# Patient Record
Sex: MALE | Race: WHITE | NOT HISPANIC OR LATINO | Employment: UNEMPLOYED | URBAN - METROPOLITAN AREA
[De-identification: names, ages, dates, MRNs, and addresses within clinical notes are randomized per-mention and may not be internally consistent; named-entity substitution may affect disease eponyms.]

---

## 2017-04-07 RX ORDER — DIVALPROEX SODIUM 500 MG/1
TABLET, DELAYED RELEASE ORAL
Qty: 90 TABLET | Refills: 0 | Status: SHIPPED | OUTPATIENT
Start: 2017-04-07 | End: 2017-05-01 | Stop reason: SDUPTHER

## 2017-04-07 RX ORDER — PHENYTOIN SODIUM 100 MG/1
CAPSULE, EXTENDED RELEASE ORAL
Qty: 150 CAPSULE | Refills: 0 | Status: SHIPPED | OUTPATIENT
Start: 2017-04-07 | End: 2017-05-01 | Stop reason: SDUPTHER

## 2017-04-11 RX ORDER — DIVALPROEX SODIUM 500 MG/1
TABLET, DELAYED RELEASE ORAL
Qty: 90 TABLET | Refills: 11 | OUTPATIENT
Start: 2017-04-11

## 2017-04-11 RX ORDER — PHENYTOIN SODIUM 100 MG/1
CAPSULE, EXTENDED RELEASE ORAL
Qty: 150 CAPSULE | Refills: 11 | OUTPATIENT
Start: 2017-04-11

## 2017-05-01 ENCOUNTER — OFFICE VISIT (OUTPATIENT)
Dept: NEUROLOGY | Facility: CLINIC | Age: 32
End: 2017-05-01

## 2017-05-01 VITALS
HEIGHT: 60 IN | WEIGHT: 315 LBS | SYSTOLIC BLOOD PRESSURE: 125 MMHG | BODY MASS INDEX: 61.84 KG/M2 | DIASTOLIC BLOOD PRESSURE: 80 MMHG

## 2017-05-01 DIAGNOSIS — R56.9 GENERALIZED CONVULSIVE SEIZURES (HCC): Primary | ICD-10-CM

## 2017-05-01 PROCEDURE — 99214 OFFICE O/P EST MOD 30 MIN: CPT | Performed by: PSYCHIATRY & NEUROLOGY

## 2017-05-01 RX ORDER — PHENYTOIN SODIUM 100 MG/1
CAPSULE, EXTENDED RELEASE ORAL
Qty: 150 CAPSULE | Refills: 11 | Status: SHIPPED | OUTPATIENT
Start: 2017-05-01 | End: 2018-04-04 | Stop reason: SDUPTHER

## 2017-05-01 RX ORDER — HALOPERIDOL 0.5 MG/1
TABLET ORAL
Refills: 3 | COMMUNITY
Start: 2017-04-19 | End: 2020-10-08 | Stop reason: ALTCHOICE

## 2017-05-01 RX ORDER — OLANZAPINE 5 MG/1
TABLET ORAL
Refills: 3 | COMMUNITY
Start: 2017-04-19 | End: 2017-05-01

## 2017-05-01 RX ORDER — DIVALPROEX SODIUM 500 MG/1
TABLET, DELAYED RELEASE ORAL
Qty: 90 TABLET | Refills: 11 | Status: SHIPPED | OUTPATIENT
Start: 2017-05-01 | End: 2018-04-04 | Stop reason: SDUPTHER

## 2017-05-01 RX ORDER — ZONISAMIDE 100 MG/1
CAPSULE ORAL
Qty: 90 CAPSULE | Refills: 11 | Status: SHIPPED | OUTPATIENT
Start: 2017-05-01 | End: 2018-05-06 | Stop reason: SDUPTHER

## 2017-08-28 ENCOUNTER — TELEPHONE (OUTPATIENT)
Dept: NEUROLOGY | Facility: CLINIC | Age: 32
End: 2017-08-28

## 2017-08-28 DIAGNOSIS — R56.9 GENERALIZED CONVULSIVE SEIZURES (HCC): Primary | ICD-10-CM

## 2017-08-28 NOTE — TELEPHONE ENCOUNTER
Would like to have levels of Dilantin and Depakote before changing medicines.  Orders for levels are in the chart

## 2017-08-28 NOTE — TELEPHONE ENCOUNTER
----- Message from Natividad Maddox sent at 8/28/2017 12:20 PM EDT -----  Contact: [phone]  Patient's father calling to let us know that his son is having a lot of seizures. He requests that the medication be upped. If he needs to make an appt please let him know. Patient last saw Dr. Escoto in April. Call him when you can, thank you.

## 2018-04-04 RX ORDER — PHENYTOIN SODIUM 100 MG/1
CAPSULE, EXTENDED RELEASE ORAL
Qty: 150 CAPSULE | Refills: 0 | Status: SHIPPED | OUTPATIENT
Start: 2018-04-04 | End: 2018-05-21 | Stop reason: SDUPTHER

## 2018-04-04 RX ORDER — DIVALPROEX SODIUM 500 MG/1
TABLET, DELAYED RELEASE ORAL
Qty: 90 TABLET | Refills: 0 | Status: SHIPPED | OUTPATIENT
Start: 2018-04-04 | End: 2018-05-21 | Stop reason: SDUPTHER

## 2018-05-07 RX ORDER — ZONISAMIDE 100 MG/1
CAPSULE ORAL
Qty: 90 CAPSULE | Refills: 0 | Status: SHIPPED | OUTPATIENT
Start: 2018-05-07 | End: 2018-05-21 | Stop reason: SDUPTHER

## 2018-05-21 ENCOUNTER — OFFICE VISIT (OUTPATIENT)
Dept: NEUROLOGY | Facility: CLINIC | Age: 33
End: 2018-05-21

## 2018-05-21 VITALS — BODY MASS INDEX: 61.84 KG/M2 | WEIGHT: 315 LBS | HEIGHT: 60 IN

## 2018-05-21 DIAGNOSIS — R56.9 CONVULSIONS, UNSPECIFIED CONVULSION TYPE (HCC): Primary | ICD-10-CM

## 2018-05-21 PROCEDURE — 99213 OFFICE O/P EST LOW 20 MIN: CPT | Performed by: PSYCHIATRY & NEUROLOGY

## 2018-05-21 RX ORDER — DIVALPROEX SODIUM 500 MG/1
TABLET, DELAYED RELEASE ORAL
Qty: 90 TABLET | Refills: 11 | Status: SHIPPED | OUTPATIENT
Start: 2018-05-21 | End: 2018-11-19 | Stop reason: SDUPTHER

## 2018-05-21 RX ORDER — ZONISAMIDE 100 MG/1
200 CAPSULE ORAL 2 TIMES DAILY
Qty: 120 CAPSULE | Refills: 11 | Status: SHIPPED | OUTPATIENT
Start: 2018-05-21 | End: 2018-11-19 | Stop reason: SDUPTHER

## 2018-05-21 RX ORDER — PHENYTOIN SODIUM 100 MG/1
CAPSULE, EXTENDED RELEASE ORAL
Qty: 150 CAPSULE | Refills: 0 | Status: SHIPPED | OUTPATIENT
Start: 2018-05-21 | End: 2018-07-08 | Stop reason: SDUPTHER

## 2018-05-21 NOTE — PROGRESS NOTES
Subjective:     Patient ID: Ventura Russell is a 33 y.o. male.    History of Present Illness   Seizures have increased in number.  He is having 6 or 8 a month.  They're generalized, tonic-clonic history is taken from patient's father.  Medicines are reviewed.  I tried to get into impact but the computer system would not allow this.  In talking the father is been on Topamax Neurontin Tegretol Lamictal Trileptal in the past.  Never on Keppra.  The patient can be irritable at times.    The following portions of the patient's history were reviewed and updated as appropriate: allergies, current medications, past family history, past medical history, past social history, past surgical history and problem list.      Current Outpatient Prescriptions:   •  citalopram (CeleXA) 40 MG tablet, Take  by mouth daily., Disp: , Rfl:   •  divalproex (DEPAKOTE) 500 MG DR tablet, 1 am, 2 hs, Disp: 90 tablet, Rfl: 11  •  haloperidol (HALDOL) 0.5 MG tablet, , Disp: , Rfl: 3  •  phenytoin (DILANTIN) 100 MG ER capsule, 2 am, 3 hs, Disp: 150 capsule, Rfl: 0  •  traZODone (DESYREL) 100 MG tablet, Take 300 mg by mouth every night., Disp: , Rfl:   •  zonisamide (ZONEGRAN) 100 MG capsule, Take 2 capsules by mouth 2 (Two) Times a Day., Disp: 120 capsule, Rfl: 11    Review of Systems   Constitutional: Negative.    Neurological: Positive for seizures (LAST SEIZURE WAS ABOYT 2 WEEKS AGO). Negative for dizziness, tremors, syncope, facial asymmetry, speech difficulty, weakness, light-headedness, numbness and headaches.   Psychiatric/Behavioral: Negative.         Objective:    Neurologic Exam  The patient is mentally handicapped.   Funduscopy, eye movements and pupillary reflexes were normal.  Visual fields were symmetric to opticokinetic nystagmus tape  No facial weakness noted.  No pattern of focal weakness.  Gait was unremarkable.  Morbidly obese.  Physical Exam    Assessment/Plan:     Ventura was seen today for seizures.    Diagnoses and all  orders for this visit:    Convulsions, unspecified convulsion type    Other orders  -     zonisamide (ZONEGRAN) 100 MG capsule; Take 2 capsules by mouth 2 (Two) Times a Day.  -     phenytoin (DILANTIN) 100 MG ER capsule; 2 am, 3 hs  -     divalproex (DEPAKOTE) 500 MG DR tablet; 1 am, 2 hs         Increased Zonegran to 200 mg twice daily.  Dilantin and Depakote the same.  Phone follow-up in 6 weeks.  Follow-up the office in 6 months. Thank you for allowing me to share in the care of this patient.  Remigio Escoto M.D.

## 2018-07-09 RX ORDER — PHENYTOIN SODIUM 100 MG/1
CAPSULE, EXTENDED RELEASE ORAL
Qty: 150 CAPSULE | Refills: 0 | Status: SHIPPED | OUTPATIENT
Start: 2018-07-09 | End: 2018-08-12 | Stop reason: SDUPTHER

## 2018-08-13 RX ORDER — PHENYTOIN SODIUM 100 MG/1
CAPSULE, EXTENDED RELEASE ORAL
Qty: 150 CAPSULE | Refills: 9 | Status: SHIPPED | OUTPATIENT
Start: 2018-08-13 | End: 2018-11-19 | Stop reason: SDUPTHER

## 2018-11-19 ENCOUNTER — OFFICE VISIT (OUTPATIENT)
Dept: NEUROLOGY | Facility: CLINIC | Age: 33
End: 2018-11-19

## 2018-11-19 VITALS — WEIGHT: 315 LBS | HEIGHT: 60 IN | BODY MASS INDEX: 61.84 KG/M2

## 2018-11-19 DIAGNOSIS — R56.9 GENERALIZED CONVULSIVE SEIZURES (HCC): Primary | ICD-10-CM

## 2018-11-19 PROCEDURE — 99213 OFFICE O/P EST LOW 20 MIN: CPT | Performed by: PSYCHIATRY & NEUROLOGY

## 2018-11-19 RX ORDER — PHENYTOIN SODIUM 100 MG/1
CAPSULE, EXTENDED RELEASE ORAL
Qty: 150 CAPSULE | Refills: 11 | Status: SHIPPED | OUTPATIENT
Start: 2018-11-19 | End: 2018-11-30 | Stop reason: SDUPTHER

## 2018-11-19 RX ORDER — DIVALPROEX SODIUM 500 MG/1
TABLET, DELAYED RELEASE ORAL
Qty: 90 TABLET | Refills: 11 | Status: SHIPPED | OUTPATIENT
Start: 2018-11-19 | End: 2019-03-19 | Stop reason: SDUPTHER

## 2018-11-19 RX ORDER — ZONISAMIDE 100 MG/1
200 CAPSULE ORAL 2 TIMES DAILY
Qty: 120 CAPSULE | Refills: 11 | Status: SHIPPED | OUTPATIENT
Start: 2018-11-19 | End: 2019-03-19 | Stop reason: SDUPTHER

## 2018-11-19 NOTE — PROGRESS NOTES
Subjective:     Patient ID: Ventura Russell is a 33 y.o. male.    History of Present Illness  The patient was seen back in the office for follow-up of seizures.  He has been having more seizures recently.  He's been under more stress as his father passed away a few months ago.  History was taken from the mother today.  This is a first-time I've seen the mother.  Nontoxic.    The following portions of the patient's history were reviewed and updated as appropriate: allergies, current medications, past family history, past medical history, past social history, past surgical history and problem list.      Current Outpatient Medications:   •  citalopram (CeleXA) 40 MG tablet, Take  by mouth daily., Disp: , Rfl:   •  divalproex (DEPAKOTE) 500 MG DR tablet, 1 am, 2 hs, Disp: 90 tablet, Rfl: 11  •  haloperidol (HALDOL) 0.5 MG tablet, , Disp: , Rfl: 3  •  phenytoin (DILANTIN) 100 MG ER capsule, 2 am, 3 hs, Disp: 150 capsule, Rfl: 11  •  traZODone (DESYREL) 100 MG tablet, Take 300 mg by mouth every night., Disp: , Rfl:   •  zonisamide (ZONEGRAN) 100 MG capsule, Take 2 capsules by mouth 2 (Two) Times a Day., Disp: 120 capsule, Rfl: 11    Review of Systems   Constitutional: Negative.    Neurological: Positive for dizziness, seizures (last seizure was 11/19/2018) and headaches. Negative for tremors, syncope, facial asymmetry, speech difficulty, weakness, light-headedness and numbness.   Psychiatric/Behavioral: Negative.         Objective:    Neurologic Exam  Morbidly obese.. The patient is mentally handicapped.   Funduscopy, eye movements and pupillary reflexes were normal.  Visual fields were symmetric to opticokinetic nystagmus tape  No facial weakness noted.  No pattern of focal weakness.  Gait was unremarkable.  Physical Exam    Assessment/Plan:     Ventura was seen today for seizures.    Diagnoses and all orders for this visit:    Generalized convulsive seizures (CMS/HCC)  -     Phenytoin Level, Total; Future  -      Valproic Acid Level, Total; Future    Other orders  -     zonisamide (ZONEGRAN) 100 MG capsule; Take 2 capsules by mouth 2 (Two) Times a Day.  -     phenytoin (DILANTIN) 100 MG ER capsule; 2 am, 3 hs  -     divalproex (DEPAKOTE) 500 MG DR tablet; 1 am, 2 hs         Will check Dilantin and valproic acid levels and then consider changing his medicine as she has been having increase seizures.  Certainly these could be related to a lot of stress.  Follow-up in the office in 3 months. Thank you for allowing me to share in the care of this patient.  Remigio Escoto M.D.

## 2018-11-30 ENCOUNTER — TELEPHONE (OUTPATIENT)
Dept: NEUROLOGY | Facility: CLINIC | Age: 33
End: 2018-11-30

## 2018-11-30 RX ORDER — PHENYTOIN SODIUM 100 MG/1
300 CAPSULE, EXTENDED RELEASE ORAL 2 TIMES DAILY
Qty: 180 CAPSULE | Refills: 11 | Status: SHIPPED | OUTPATIENT
Start: 2018-11-30 | End: 2019-03-19 | Stop reason: SDUPTHER

## 2018-11-30 NOTE — TELEPHONE ENCOUNTER
----- Message from Remigio Escoto Jr., MD sent at 11/30/2018  9:48 AM EST -----  Please tell mom to increase Dilantin to 3 pills twice a day.  I already sent in new prescription

## 2019-02-25 ENCOUNTER — TELEPHONE (OUTPATIENT)
Dept: NEUROLOGY | Facility: CLINIC | Age: 34
End: 2019-02-25

## 2019-02-25 NOTE — TELEPHONE ENCOUNTER
----- Message from Dex Sinclair sent at 2/25/2019  3:36 PM EST -----  Contact: [phone]  PT HAD AN APPOINTMENT TODAY BUT WAS LATE AND RESCHEDULED APPOINTMENT. FLORENTINO (PT MOTHER) HAD A COUPLE OF QUESTIONS FOR FRED.     1)DR BRUNSON PERSCRIBBED HALOPERIDOL 5 MG AND WANTED TO KNOW IF THIS WOULD EFFECT HIS SZ MEDS.     2) PT HAD 3 SZ IN A ROW, BACK TO BACK WITHIN 30 MINS. FLORENTINO WANTS TO KNOW IF IT IS THE MEDICATIONS OR SOMETHING BECAUSE HE NEVER HAS BACK TO BACK SZ LIKE THIS.    PLEASE ADVISE AND CALL FLORENTINO [phone]

## 2019-02-26 NOTE — TELEPHONE ENCOUNTER
Haloperidol can make seizures worse but usually does not.  If increased seizures continue on the haloperidol would recommend looking for something else.  Do not know that the seizure medicine is causing the seizures.  If the seizures keep up and are frequent need to call back

## 2019-03-19 ENCOUNTER — OFFICE VISIT (OUTPATIENT)
Dept: NEUROLOGY | Facility: CLINIC | Age: 34
End: 2019-03-19

## 2019-03-19 VITALS — WEIGHT: 315 LBS | BODY MASS INDEX: 78.12 KG/M2

## 2019-03-19 DIAGNOSIS — R56.9 GENERALIZED CONVULSIVE SEIZURES (HCC): Primary | ICD-10-CM

## 2019-03-19 PROCEDURE — 99213 OFFICE O/P EST LOW 20 MIN: CPT | Performed by: PSYCHIATRY & NEUROLOGY

## 2019-03-19 RX ORDER — DIVALPROEX SODIUM 500 MG/1
1000 TABLET, DELAYED RELEASE ORAL 2 TIMES DAILY
Qty: 120 TABLET | Refills: 11 | Status: SHIPPED | OUTPATIENT
Start: 2019-03-19 | End: 2019-06-20 | Stop reason: SDUPTHER

## 2019-03-19 RX ORDER — ZONISAMIDE 100 MG/1
200 CAPSULE ORAL 2 TIMES DAILY
Qty: 120 CAPSULE | Refills: 11 | Status: SHIPPED | OUTPATIENT
Start: 2019-03-19 | End: 2019-06-20 | Stop reason: SDUPTHER

## 2019-03-19 RX ORDER — PHENYTOIN SODIUM 100 MG/1
300 CAPSULE, EXTENDED RELEASE ORAL 2 TIMES DAILY
Qty: 180 CAPSULE | Refills: 11 | Status: SHIPPED | OUTPATIENT
Start: 2019-03-19 | End: 2019-06-20 | Stop reason: SDUPTHER

## 2019-03-19 NOTE — PROGRESS NOTES
Subjective:     Patient ID: Ventura Russell is a 33 y.o. male.    History of Present Illness    Seen 3 months ago.  He is having occasional clusters of seizures where he will have about 3 in a row.  This occurs every week or so.  He is on Depakote 500 mg 1 morning 2 at night, Dilantin 100 mg 3 in the morning 3 at night and Zonegran 100 2 in the morning and 2 at night.  He was started on Haldol for behavior.  History is taken from the patient's mother.  The following portions of the patient's history were reviewed and updated as appropriate: allergies, current medications, past family history, past medical history, past social history, past surgical history and problem list.      Current Outpatient Medications:   •  citalopram (CeleXA) 40 MG tablet, Take  by mouth daily., Disp: , Rfl:   •  divalproex (DEPAKOTE) 500 MG DR tablet, Take 2 tablets by mouth 2 (Two) Times a Day. 1 am, 2 hs, Disp: 120 tablet, Rfl: 11  •  haloperidol (HALDOL) 0.5 MG tablet, 7.5 mg., Disp: , Rfl: 3  •  phenytoin (DILANTIN) 100 MG ER capsule, Take 3 capsules by mouth 2 (Two) Times a Day. 2 am, 3 hs, Disp: 180 capsule, Rfl: 11  •  traZODone (DESYREL) 100 MG tablet, Take 300 mg by mouth every night., Disp: , Rfl:   •  zonisamide (ZONEGRAN) 100 MG capsule, Take 2 capsules by mouth 2 (Two) Times a Day., Disp: 120 capsule, Rfl: 11    Review of Systems   Constitutional: Negative.    Neurological: Positive for seizures. Negative for dizziness, tremors, syncope, facial asymmetry, speech difficulty, weakness, light-headedness, numbness and headaches.   Psychiatric/Behavioral: Positive for agitation, behavioral problems and dysphoric mood. Negative for confusion, decreased concentration, hallucinations, self-injury, sleep disturbance and suicidal ideas. The patient is nervous/anxious. The patient is not hyperactive.           I have reviewed ROS completed by medical assistant.     Objective:    Neurologic Exam  Morbidly obese.  The patient is mentally  handicapped.   Funduscopy, eye movements and pupillary reflexes were normal.  Visual fields were symmetric to opticokinetic nystagmus tape  No facial weakness noted.  No pattern of focal weakness.  Gait was unremarkable.    Assessment/Plan:     Ventura was seen today for seizures.    Diagnoses and all orders for this visit:    Generalized convulsive seizures (CMS/HCC)    Other orders  -     divalproex (DEPAKOTE) 500 MG DR tablet; Take 2 tablets by mouth 2 (Two) Times a Day. 1 am, 2 hs  -     phenytoin (DILANTIN) 100 MG ER capsule; Take 3 capsules by mouth 2 (Two) Times a Day. 2 am, 3 hs  -     zonisamide (ZONEGRAN) 100 MG capsule; Take 2 capsules by mouth 2 (Two) Times a Day.           Increased Depakote to 1000 mg twice daily.  It may take several weeks before the Depakote becomes effective.  This also could potentially help his behavior.  Prescription drug management - meds as above    Follow-up in the office in one year. Thank you for allowing me to share in the care of this patient.  Remigio Escoto M.D.

## 2019-06-20 ENCOUNTER — OFFICE VISIT (OUTPATIENT)
Dept: NEUROLOGY | Facility: CLINIC | Age: 34
End: 2019-06-20

## 2019-06-20 VITALS — WEIGHT: 315 LBS | BODY MASS INDEX: 61.84 KG/M2 | HEIGHT: 60 IN

## 2019-06-20 DIAGNOSIS — R56.9 GENERALIZED CONVULSIVE SEIZURES (HCC): Primary | ICD-10-CM

## 2019-06-20 PROCEDURE — 99213 OFFICE O/P EST LOW 20 MIN: CPT | Performed by: PSYCHIATRY & NEUROLOGY

## 2019-06-20 RX ORDER — ZONISAMIDE 100 MG/1
200 CAPSULE ORAL 2 TIMES DAILY
Qty: 120 CAPSULE | Refills: 11 | Status: SHIPPED | OUTPATIENT
Start: 2019-06-20 | End: 2019-12-17 | Stop reason: SDUPTHER

## 2019-06-20 RX ORDER — PHENYTOIN SODIUM 100 MG/1
300 CAPSULE, EXTENDED RELEASE ORAL 2 TIMES DAILY
Qty: 180 CAPSULE | Refills: 11 | Status: SHIPPED | OUTPATIENT
Start: 2019-06-20 | End: 2019-12-17 | Stop reason: SDUPTHER

## 2019-06-20 RX ORDER — DIVALPROEX SODIUM 500 MG/1
1000 TABLET, DELAYED RELEASE ORAL 2 TIMES DAILY
Qty: 120 TABLET | Refills: 11 | Status: SHIPPED | OUTPATIENT
Start: 2019-06-20 | End: 2019-12-17 | Stop reason: SDUPTHER

## 2019-06-20 RX ORDER — OLANZAPINE 10 MG/1
10 TABLET ORAL NIGHTLY
COMMUNITY
End: 2020-10-08 | Stop reason: ALTCHOICE

## 2019-06-20 NOTE — PROGRESS NOTES
Subjective:     Patient ID: Ventura Russell is a 34 y.o. male.    History of Present Illness  The patient was seen back in the office for follow-up of seizures.  History is also taken from the patient's mother.  He continues to have seizures about once a month.  At times he will cluster with these.  There worse when he is upset.  This is an improvement from his previous visit.  He was last seen 3 months ago.  No clear-cut side effects.  He has lost 12 pounds.    The following portions of the patient's history were reviewed and updated as appropriate: allergies, current medications, past family history, past medical history, past social history, past surgical history and problem list.      Current Outpatient Medications:   •  citalopram (CeleXA) 40 MG tablet, Take  by mouth daily., Disp: , Rfl:   •  divalproex (DEPAKOTE) 500 MG DR tablet, Take 2 tablets by mouth 2 (Two) Times a Day. 1 am, 2 hs, Disp: 120 tablet, Rfl: 11  •  haloperidol (HALDOL) 0.5 MG tablet, 7.5 mg., Disp: , Rfl: 3  •  OLANZapine (zyPREXA) 10 MG tablet, Take 10 mg by mouth Every Night., Disp: , Rfl:   •  phenytoin (DILANTIN) 100 MG ER capsule, Take 3 capsules by mouth 2 (Two) Times a Day. 2 am, 3 hs, Disp: 180 capsule, Rfl: 11  •  traZODone (DESYREL) 100 MG tablet, Take 300 mg by mouth every night., Disp: , Rfl:   •  zonisamide (ZONEGRAN) 100 MG capsule, Take 2 capsules by mouth 2 (Two) Times a Day., Disp: 120 capsule, Rfl: 11    Review of Systems   Constitutional: Negative.    Neurological: Positive for dizziness, tremors and seizures (several over the last week). Negative for syncope, facial asymmetry, speech difficulty, weakness, light-headedness, numbness and headaches.   Psychiatric/Behavioral: Positive for agitation. Negative for behavioral problems, confusion, decreased concentration, dysphoric mood, hallucinations, self-injury, sleep disturbance and suicidal ideas. The patient is not nervous/anxious and is not hyperactive.           I have  reviewed ROS completed by medical assistant.     Objective:    Neurologic Exam  The patient is mentally handicapped.   Funduscopy, eye movements and pupillary reflexes were normal.  Visual fields were symmetric to opticokinetic nystagmus tape  No facial weakness noted.  No pattern of focal weakness.  Gait was unremarkable.  Morbidly obese.  Physical Exam    Assessment/Plan:     Ventura was seen today for seizures.    Diagnoses and all orders for this visit:    Generalized convulsive seizures (CMS/HCC)    Other orders  -     zonisamide (ZONEGRAN) 100 MG capsule; Take 2 capsules by mouth 2 (Two) Times a Day.  -     phenytoin (DILANTIN) 100 MG ER capsule; Take 3 capsules by mouth 2 (Two) Times a Day. 2 am, 3 hs  -     divalproex (DEPAKOTE) 500 MG DR tablet; Take 2 tablets by mouth 2 (Two) Times a Day. 1 am, 2 hs         Prescription drug management - meds as above    Follow-up in the office in 6 months. Thank you for allowing me to share in the care of this patient.  Remigio Escoto M.D.

## 2019-12-17 ENCOUNTER — OFFICE VISIT (OUTPATIENT)
Dept: NEUROLOGY | Facility: CLINIC | Age: 34
End: 2019-12-17

## 2019-12-17 VITALS — WEIGHT: 315 LBS | HEIGHT: 60 IN | BODY MASS INDEX: 61.84 KG/M2

## 2019-12-17 DIAGNOSIS — R56.9 CONVULSIONS, UNSPECIFIED CONVULSION TYPE (HCC): ICD-10-CM

## 2019-12-17 DIAGNOSIS — R56.9 GENERALIZED CONVULSIVE SEIZURES (HCC): Primary | ICD-10-CM

## 2019-12-17 PROCEDURE — 99213 OFFICE O/P EST LOW 20 MIN: CPT | Performed by: PSYCHIATRY & NEUROLOGY

## 2019-12-17 RX ORDER — BENZTROPINE MESYLATE 0.5 MG/1
0.5 TABLET ORAL DAILY
COMMUNITY
End: 2020-10-08 | Stop reason: ALTCHOICE

## 2019-12-17 RX ORDER — ZONISAMIDE 100 MG/1
200 CAPSULE ORAL 2 TIMES DAILY
Qty: 120 CAPSULE | Refills: 11 | Status: SHIPPED | OUTPATIENT
Start: 2019-12-17 | End: 2020-06-11 | Stop reason: SDUPTHER

## 2019-12-17 RX ORDER — FERROUS SULFATE 325(65) MG
325 TABLET ORAL 2 TIMES DAILY
COMMUNITY

## 2019-12-17 RX ORDER — FOLIC ACID 1 MG/1
1 TABLET ORAL DAILY
COMMUNITY
End: 2020-10-08 | Stop reason: ALTCHOICE

## 2019-12-17 RX ORDER — DIVALPROEX SODIUM 500 MG/1
1000 TABLET, DELAYED RELEASE ORAL 2 TIMES DAILY
Qty: 120 TABLET | Refills: 11 | Status: SHIPPED | OUTPATIENT
Start: 2019-12-17 | End: 2020-06-11 | Stop reason: SDUPTHER

## 2019-12-17 RX ORDER — PHENYTOIN SODIUM 100 MG/1
300 CAPSULE, EXTENDED RELEASE ORAL 2 TIMES DAILY
Qty: 180 CAPSULE | Refills: 11 | Status: SHIPPED | OUTPATIENT
Start: 2019-12-17 | End: 2020-06-11 | Stop reason: SDUPTHER

## 2019-12-17 NOTE — PROGRESS NOTES
Subjective:     Patient ID: Ventura Russell is a 34 y.o. male.    History of Present Illness    The patient was seen back in the office for follow-up of seizures.  No seizures since his last visit this is a dramatic improvement.  He is currently on Zonegran 100 mg 2 tablets twice daily, Dilantin 300 mg twice daily and Depakote 1000 mg twice daily.  Has developed tremor is also not eating well and does throw up food at times this is only been going on for a few weeks.  History taken from the patient's mother.  The following portions of the patient's history were reviewed and updated as appropriate: allergies, current medications, past family history, past medical history, past social history, past surgical history and problem list.      Current Outpatient Medications:   •  benztropine (COGENTIN) 0.5 MG tablet, Take 0.5 mg by mouth Daily., Disp: , Rfl:   •  citalopram (CeleXA) 40 MG tablet, Take  by mouth daily., Disp: , Rfl:   •  divalproex (DEPAKOTE) 500 MG DR tablet, Take 2 tablets by mouth 2 (Two) Times a Day. 2 bid, Disp: 120 tablet, Rfl: 11  •  ferrous sulfate 325 (65 FE) MG tablet, Take 325 mg by mouth 2 (Two) Times a Day., Disp: , Rfl:   •  folic acid (FOLVITE) 1 MG tablet, Take 1 mg by mouth Daily., Disp: , Rfl:   •  haloperidol (HALDOL) 0.5 MG tablet, 1/2 tablet in the am and afternoon  And 1 tablet at bedtime, Disp: , Rfl: 3  •  phenytoin (DILANTIN) 100 MG ER capsule, Take 3 capsules by mouth 2 (Two) Times a Day. 2 am, 3 hs, Disp: 180 capsule, Rfl: 11  •  traZODone (DESYREL) 100 MG tablet, Take 300 mg by mouth every night., Disp: , Rfl:   •  zonisamide (ZONEGRAN) 100 MG capsule, Take 2 capsules by mouth 2 (Two) Times a Day., Disp: 120 capsule, Rfl: 11  •  OLANZapine (zyPREXA) 10 MG tablet, Take 10 mg by mouth Every Night., Disp: , Rfl:     Review of Systems   Constitutional: Positive for appetite change, fatigue and unexpected weight change. Negative for fever.   HENT: Positive for trouble swallowing.  Negative for hearing loss and tinnitus.    Eyes: Positive for photophobia and visual disturbance. Negative for itching.   Respiratory: Negative for cough, shortness of breath and wheezing.    Cardiovascular: Negative for chest pain, palpitations and leg swelling.   Gastrointestinal: Positive for nausea and vomiting.   Endocrine: Negative for cold intolerance, heat intolerance and polydipsia.   Genitourinary: Negative for decreased urine volume, difficulty urinating and urgency.   Musculoskeletal: Positive for back pain (sometimes), gait problem, neck pain and neck stiffness.   Skin: Negative for color change, rash and wound.   Allergic/Immunologic: Negative for environmental allergies, food allergies and immunocompromised state.   Neurological: Positive for dizziness, tremors, weakness, light-headedness and headaches. Negative for seizures, syncope, facial asymmetry, speech difficulty and numbness.   Hematological: Negative for adenopathy.   Psychiatric/Behavioral: Positive for confusion. Negative for sleep disturbance. The patient is nervous/anxious.           I have reviewed ROS completed by medical assistant.     Objective:    Neurologic Exam  Handicapped.  Morbidly obese but has lost 100 pounds.  Funduscopy, visual fields, eye movements and pupillary reflexes were normal.  No facial weakness was noted.  Gait was normal.  No pattern of focal weakness was noted.  Physical Exam    Assessment/Plan:     Ventura was seen today for seizures.    Diagnoses and all orders for this visit:    Generalized convulsive seizures (CMS/HCC)  -     CBC & Differential; Future  -     Comprehensive Metabolic Panel; Future  -     Valproic Acid Level, Total; Future  -     Phenytoin Level, Total; Future    Convulsions, unspecified convulsion type (CMS/HCC)    Other orders  -     divalproex (DEPAKOTE) 500 MG DR tablet; Take 2 tablets by mouth 2 (Two) Times a Day. 2 bid  -     phenytoin (DILANTIN) 100 MG ER capsule; Take 3 capsules by  mouth 2 (Two) Times a Day. 2 am, 3 hs  -     zonisamide (ZONEGRAN) 100 MG capsule; Take 2 capsules by mouth 2 (Two) Times a Day.         Keep medicines the same.  To check CBC CMP as well as Depakote and Dilantin levels.  Mom to call me after this.  Prescription drug management - meds as above    Follow-up in the office in 6 months. Thank you for allowing me to share in the care of this patient.  Remigio Escoto M.D.

## 2020-01-13 ENCOUNTER — HOSPITAL ENCOUNTER (EMERGENCY)
Facility: HOSPITAL | Age: 35
Discharge: HOME | End: 2020-01-14
Attending: EMERGENCY MEDICINE | Admitting: EMERGENCY MEDICINE

## 2020-01-13 DIAGNOSIS — N39.0 ACUTE UTI: Primary | ICD-10-CM

## 2020-01-13 LAB
AMORPH URATE CRY URNS QL MICRO: ABNORMAL /HPF
ANION GAP SERPL CALCULATED.3IONS-SCNC: 10.4 MMOL/L (ref 5–15)
BACTERIA UR QL AUTO: ABNORMAL /HPF
BASOPHILS # BLD AUTO: 0.02 10*3/MM3 (ref 0–0.2)
BASOPHILS NFR BLD AUTO: 0.5 % (ref 0–1.5)
BILIRUB UR QL STRIP: ABNORMAL
BUN BLD-MCNC: 14 MG/DL (ref 6–20)
BUN/CREAT SERPL: 23.7 (ref 7–25)
CALCIUM SPEC-SCNC: 8.8 MG/DL (ref 8.6–10.5)
CHLORIDE SERPL-SCNC: 97 MMOL/L (ref 98–107)
CLARITY UR: ABNORMAL
CO2 SERPL-SCNC: 26.6 MMOL/L (ref 22–29)
COLOR UR: ABNORMAL
CREAT BLD-MCNC: 0.59 MG/DL (ref 0.76–1.27)
DEPRECATED RDW RBC AUTO: 54.6 FL (ref 37–54)
EOSINOPHIL # BLD AUTO: 0.1 10*3/MM3 (ref 0–0.4)
EOSINOPHIL NFR BLD AUTO: 2.3 % (ref 0.3–6.2)
ERYTHROCYTE [DISTWIDTH] IN BLOOD BY AUTOMATED COUNT: 16 % (ref 12.3–15.4)
FLUAV AG NPH QL: NEGATIVE
FLUBV AG NPH QL IA: NEGATIVE
GFR SERPL CREATININE-BSD FRML MDRD: >150 ML/MIN/1.73
GLUCOSE BLD-MCNC: 109 MG/DL (ref 65–99)
GLUCOSE UR STRIP-MCNC: NEGATIVE MG/DL
HCT VFR BLD AUTO: 41.9 % (ref 37.5–51)
HGB BLD-MCNC: 13.5 G/DL (ref 13–17.7)
HGB UR QL STRIP.AUTO: NEGATIVE
HYALINE CASTS UR QL AUTO: ABNORMAL /LPF
IMM GRANULOCYTES # BLD AUTO: 0.02 10*3/MM3 (ref 0–0.05)
IMM GRANULOCYTES NFR BLD AUTO: 0.5 % (ref 0–0.5)
KETONES UR QL STRIP: ABNORMAL
LEUKOCYTE ESTERASE UR QL STRIP.AUTO: NEGATIVE
LYMPHOCYTES # BLD AUTO: 2.34 10*3/MM3 (ref 0.7–3.1)
LYMPHOCYTES NFR BLD AUTO: 53.2 % (ref 19.6–45.3)
MCH RBC QN AUTO: 30.2 PG (ref 26.6–33)
MCHC RBC AUTO-ENTMCNC: 32.2 G/DL (ref 31.5–35.7)
MCV RBC AUTO: 93.7 FL (ref 79–97)
MONOCYTES # BLD AUTO: 0.73 10*3/MM3 (ref 0.1–0.9)
MONOCYTES NFR BLD AUTO: 16.6 % (ref 5–12)
MUCOUS THREADS URNS QL MICRO: ABNORMAL /HPF
NEUTROPHILS # BLD AUTO: 1.19 10*3/MM3 (ref 1.7–7)
NEUTROPHILS NFR BLD AUTO: 26.9 % (ref 42.7–76)
NITRITE UR QL STRIP: POSITIVE
NRBC BLD AUTO-RTO: 0 /100 WBC (ref 0–0.2)
PH UR STRIP.AUTO: 7 [PH] (ref 4.5–8)
PLATELET # BLD AUTO: 79 10*3/MM3 (ref 140–450)
PMV BLD AUTO: 9.8 FL (ref 6–12)
POTASSIUM BLD-SCNC: 3.7 MMOL/L (ref 3.5–5.2)
PROT UR QL STRIP: ABNORMAL
RBC # BLD AUTO: 4.47 10*6/MM3 (ref 4.14–5.8)
RBC # UR: ABNORMAL /HPF
REF LAB TEST METHOD: ABNORMAL
SODIUM BLD-SCNC: 134 MMOL/L (ref 136–145)
SP GR UR STRIP: 1.02 (ref 1–1.03)
SQUAMOUS #/AREA URNS HPF: ABNORMAL /HPF
UROBILINOGEN UR QL STRIP: ABNORMAL
VALPROATE SERPL-MCNC: 118.3 MCG/ML (ref 50–125)
WBC NRBC COR # BLD: 4.4 10*3/MM3 (ref 3.4–10.8)
WBC UR QL AUTO: ABNORMAL /HPF

## 2020-01-13 PROCEDURE — 99284 EMERGENCY DEPT VISIT MOD MDM: CPT

## 2020-01-13 PROCEDURE — 99282 EMERGENCY DEPT VISIT SF MDM: CPT | Performed by: EMERGENCY MEDICINE

## 2020-01-13 PROCEDURE — 80048 BASIC METABOLIC PNL TOTAL CA: CPT | Performed by: EMERGENCY MEDICINE

## 2020-01-13 PROCEDURE — 87804 INFLUENZA ASSAY W/OPTIC: CPT | Performed by: EMERGENCY MEDICINE

## 2020-01-13 PROCEDURE — 25010000002 CEFTRIAXONE SODIUM-DEXTROSE 1-3.74 GM-%(50ML) RECONSTITUTED SOLUTION: Performed by: EMERGENCY MEDICINE

## 2020-01-13 PROCEDURE — 96365 THER/PROPH/DIAG IV INF INIT: CPT

## 2020-01-13 PROCEDURE — 80164 ASSAY DIPROPYLACETIC ACD TOT: CPT | Performed by: EMERGENCY MEDICINE

## 2020-01-13 PROCEDURE — 85025 COMPLETE CBC W/AUTO DIFF WBC: CPT | Performed by: EMERGENCY MEDICINE

## 2020-01-13 PROCEDURE — 81001 URINALYSIS AUTO W/SCOPE: CPT | Performed by: EMERGENCY MEDICINE

## 2020-01-13 RX ORDER — CEFUROXIME AXETIL 500 MG/1
500 TABLET ORAL 2 TIMES DAILY
Qty: 14 TABLET | Refills: 0 | Status: SHIPPED | OUTPATIENT
Start: 2020-01-13 | End: 2020-01-20

## 2020-01-13 RX ORDER — SODIUM CHLORIDE 0.9 % (FLUSH) 0.9 %
10 SYRINGE (ML) INJECTION AS NEEDED
Status: DISCONTINUED | OUTPATIENT
Start: 2020-01-13 | End: 2020-01-14 | Stop reason: HOSPADM

## 2020-01-13 RX ORDER — CEFTRIAXONE 1 G/50ML
1 INJECTION, SOLUTION INTRAVENOUS ONCE
Status: COMPLETED | OUTPATIENT
Start: 2020-01-13 | End: 2020-01-13

## 2020-01-13 RX ADMIN — SODIUM CHLORIDE 1000 ML: 9 INJECTION, SOLUTION INTRAVENOUS at 21:28

## 2020-01-13 RX ADMIN — CEFTRIAXONE 1 G: 1 INJECTION, SOLUTION INTRAVENOUS at 22:40

## 2020-01-14 VITALS
WEIGHT: 315 LBS | OXYGEN SATURATION: 97 % | RESPIRATION RATE: 20 BRPM | SYSTOLIC BLOOD PRESSURE: 138 MMHG | TEMPERATURE: 97.8 F | HEART RATE: 88 BPM | DIASTOLIC BLOOD PRESSURE: 78 MMHG | BODY MASS INDEX: 61.84 KG/M2 | HEIGHT: 60 IN

## 2020-01-14 NOTE — ED PROVIDER NOTES
Subjective   Ventura Russell is a 33 yo WM who presents secondary to his mother's concerns over dehydration.  And the patient provides no history.  Mother reports a month ago patient was diagnosed with influenza, strep throat and bilateral ear infections.  He was placed on antibiotics.  Mother reports he improved somewhat.  However his improvement was short-lived.  Patient was seen at [facility] emergency room and diagnosed with urinary tract infection.  He was again placed on antibiotics.  However no significant improve all.  Mother is concerned that patient has become dehydrated as well as malnourished.  She reports patient has not eaten in a month.  She has been giving the patient Ensure type shakes and Pedialyte for hydration.  Tonight she called EMS for transportation to the emergency room.  The patient was stable in route.    Mother reports patient and friend have been ongoing but pertaining to weight loss.  She reports patient has lost 100 pounds.      History provided by: Mother.  History limited by: Patient appears to have some learning disability.      Review of Systems   Unable to perform ROS: Psychiatric disorder       Past Medical History:   Diagnosis Date   • Bipolar 1 disorder (CMS/HCC)    • Depression    • Epilepsy (CMS/HCC)    • Seizures (CMS/HCC)        Allergies   Allergen Reactions   • Bactrim [Sulfamethoxazole-Trimethoprim] Mental Status Change       Past Surgical History:   Procedure Laterality Date   • NO PAST SURGERIES     • TONSILLECTOMY         History reviewed. No pertinent family history.    Social History     Socioeconomic History   • Marital status: Single     Spouse name: Not on file   • Number of children: Not on file   • Years of education: Not on file   • Highest education level: Not on file   Tobacco Use   • Smoking status: Never Smoker   • Smokeless tobacco: Never Used   Substance and Sexual Activity   • Alcohol use: No   • Drug use: No           Objective   Physical Exam    Constitutional: He appears well-developed and well-nourished. No distress.   34-year-old white male laying in bed.  Patient is morbidly obese.  He is unkempt.  Vital signs notable for BP of 148/115.  Otherwise unremarkable.  As I am taking the patient's radial pulse he turns toward me and screams angrily toward me.  He does not provide any useful information   HENT:   Head: Normocephalic and atraumatic.   Right Ear: External ear normal.   Left Ear: External ear normal.   Nose: Nose normal.   Mouth/Throat: Oropharynx is clear and moist.   Eyes: Pupils are equal, round, and reactive to light. Conjunctivae and EOM are normal.   Neck: Normal range of motion. Neck supple.   Cardiovascular: Normal rate, regular rhythm, normal heart sounds and intact distal pulses. Exam reveals no gallop and no friction rub.   No murmur heard.  Pulmonary/Chest: Effort normal and breath sounds normal. No stridor. No respiratory distress. He has no wheezes. He has no rales.   Abdominal: Soft. He exhibits no distension. There is no tenderness.   Musculoskeletal: Normal range of motion. He exhibits no edema.   Neurological: He is alert. He has normal reflexes. No cranial nerve deficit.   Skin: Skin is warm and dry. No rash noted. He is not diaphoretic. No erythema.   Psychiatric: He has a normal mood and affect. His behavior is normal.   Nursing note and vitals reviewed.      Procedures           ED Course  ED Course as of Jan 13 2337   Mon Jan 13, 2020 2125 Will obtain CBC, BMP, UA, flu swab and valproic acid level.  Giving patient 1 L normal saline.    [SS]   2228 CBC is unremarkable.  CMP does not show any significant electrolyte abnormalities.  No evidence of dehydration.  Flu swab negative.  UA does show trace ketones, protein present in the urine, nitrite positive, 1+ bacteria.  Will treat for UTI.    [SS]   2325 Antibiotics completed.  IV fluids almost completely infused.  Discussed at length with patient's mother's results.   Discussed that there is no evidence of dehydration.  Placing on oral antibiotics.  Will DC home.Prescriptions1-Ceftin    [SS]      ED Course User Index  [SS] Talon Crane MD      Labs Reviewed   BASIC METABOLIC PANEL - Abnormal; Notable for the following components:       Result Value    Glucose 109 (*)     Creatinine 0.59 (*)     Sodium 134 (*)     Chloride 97 (*)     All other components within normal limits    Narrative:     GFR Normal >60  Chronic Kidney Disease <60  Kidney Failure <15     URINALYSIS W/ MICROSCOPIC IF INDICATED (NO CULTURE) - Abnormal; Notable for the following components:    Color, UA Dark Yellow (*)     Appearance, UA Cloudy (*)     Ketones, UA Trace (*)     Bilirubin, UA Small (1+) (*)     Protein, UA 30 mg/dL (1+) (*)     Nitrite, UA Positive (*)     All other components within normal limits   CBC WITH AUTO DIFFERENTIAL - Abnormal; Notable for the following components:    RDW 16.0 (*)     RDW-SD 54.6 (*)     Platelets 79 (*)     Neutrophil % 26.9 (*)     Lymphocyte % 53.2 (*)     Monocyte % 16.6 (*)     Neutrophils, Absolute 1.19 (*)     All other components within normal limits   URINALYSIS, MICROSCOPIC ONLY - Abnormal; Notable for the following components:    Bacteria, UA 1+ (*)     Mucus, UA Small/1+ (*)     All other components within normal limits   INFLUENZA ANTIGEN, RAPID - Normal   VALPROIC ACID LEVEL, TOTAL - Normal    Narrative:     Therapeutic Ranges for Valproic Acid    Epilepsy:       mcg/ml  Bipolar/Eveline  up to 125 mcg/ml     CBC AND DIFFERENTIAL    Narrative:     The following orders were created for panel order CBC & Differential.  Procedure                               Abnormality         Status                     ---------                               -----------         ------                     CBC Auto Differential[[id number]]        Abnormal            Final result                 Please view results for these tests on the individual orders.     No  results found.                                           [facility]    Final diagnoses:   Acute UTI            Talon Crane MD  01/13/20 8641

## 2020-01-14 NOTE — DISCHARGE INSTRUCTIONS
Medication as directed.  Plenty of fluids.  Follow-up with your PCP as above.  Return to ED for medical emergencies.

## 2020-03-19 ENCOUNTER — TRANSCRIBE ORDERS (OUTPATIENT)
Dept: ADMINISTRATIVE | Facility: HOSPITAL | Age: 35
End: 2020-03-19

## 2020-03-19 DIAGNOSIS — R63.30 FEEDING DIFFICULTIES: Primary | ICD-10-CM

## 2020-03-30 ENCOUNTER — APPOINTMENT (OUTPATIENT)
Dept: GENERAL RADIOLOGY | Facility: HOSPITAL | Age: 35
End: 2020-03-30

## 2020-05-12 ENCOUNTER — APPOINTMENT (OUTPATIENT)
Dept: GENERAL RADIOLOGY | Facility: HOSPITAL | Age: 35
End: 2020-05-12

## 2020-06-11 ENCOUNTER — OFFICE VISIT (OUTPATIENT)
Dept: NEUROLOGY | Facility: CLINIC | Age: 35
End: 2020-06-11

## 2020-06-11 DIAGNOSIS — R56.9 CONVULSIONS, UNSPECIFIED CONVULSION TYPE (HCC): Primary | ICD-10-CM

## 2020-06-11 PROCEDURE — 99442 PR PHYS/QHP TELEPHONE EVALUATION 11-20 MIN: CPT | Performed by: PSYCHIATRY & NEUROLOGY

## 2020-06-11 RX ORDER — PHENYTOIN SODIUM 100 MG/1
CAPSULE, EXTENDED RELEASE ORAL
OUTPATIENT
Start: 2020-06-11

## 2020-06-11 RX ORDER — DIVALPROEX SODIUM 500 MG/1
TABLET, DELAYED RELEASE ORAL
OUTPATIENT
Start: 2020-06-11

## 2020-06-11 RX ORDER — ZONISAMIDE 100 MG/1
200 CAPSULE ORAL 2 TIMES DAILY
Qty: 120 CAPSULE | Refills: 11 | Status: SHIPPED | OUTPATIENT
Start: 2020-06-11 | End: 2021-04-06 | Stop reason: SDUPTHER

## 2020-06-11 RX ORDER — ZONISAMIDE 100 MG/1
CAPSULE ORAL
Qty: 360 CAPSULE | OUTPATIENT
Start: 2020-06-11

## 2020-06-11 RX ORDER — DIVALPROEX SODIUM 500 MG/1
1000 TABLET, DELAYED RELEASE ORAL 2 TIMES DAILY
Qty: 120 TABLET | Refills: 11 | Status: SHIPPED | OUTPATIENT
Start: 2020-06-11 | End: 2021-06-11 | Stop reason: SDUPTHER

## 2020-06-11 RX ORDER — DOCUSATE SODIUM 100 MG/1
CAPSULE, LIQUID FILLED ORAL
COMMUNITY
Start: 2020-05-09

## 2020-06-11 RX ORDER — HYDROCHLOROTHIAZIDE 12.5 MG/1
TABLET ORAL DAILY
COMMUNITY
Start: 2020-06-02

## 2020-06-11 RX ORDER — PHENYTOIN SODIUM 100 MG/1
300 CAPSULE, EXTENDED RELEASE ORAL 2 TIMES DAILY
Qty: 180 CAPSULE | Refills: 11 | Status: SHIPPED | OUTPATIENT
Start: 2020-06-11 | End: 2021-08-20 | Stop reason: SDUPTHER

## 2020-06-11 RX ORDER — MULTIVITAMIN WITH IRON
TABLET ORAL DAILY
COMMUNITY
Start: 2020-04-13

## 2020-06-11 NOTE — PROGRESS NOTES
Phone visit.  Follow-up of seizures.  I have managed the patient for a number years.  He has been very difficult to control in the past.  Is currently on the 3 medicines listed below and this is the best he has done for a long time.  The patient also has morbid obesity but has lost nearly 100 pounds over the past year or so.  History taken from the mother who is happy with the patient's current control and treatment.         Ventura was seen today for seizures.    Diagnoses and all orders for this visit:    Convulsions, unspecified convulsion type (CMS/HCC)    Other orders  -     zonisamide (ZONEGRAN) 100 MG capsule; Take 2 capsules by mouth 2 (Two) Times a Day.  -     phenytoin (DILANTIN) 100 MG ER capsule; Take 3 capsules by mouth 2 (Two) Times a Day. 2 am, 3 hs  -     divalproex (DEPAKOTE) 500 MG DR tablet; Take 2 tablets by mouth 2 (Two) Times a Day. 2 bid       Prescription drug management - meds as above    Follow-up in the office in one year. Thank you for allowing me to share in the care of this patient.  Remigio Escoto M.D.    You have chosen to receive care through a telephone visit. Do you consent to use a telephone visit for your medical care today? Yes  This visit has been rescheduled as a phone visit to comply with patient safety concerns in accordance with CDC recommendations. Total time of discussion was 15 minutes.

## 2020-10-28 ENCOUNTER — OFFICE VISIT (OUTPATIENT)
Dept: SURGERY | Facility: CLINIC | Age: 35
End: 2020-10-28

## 2020-10-28 VITALS
RESPIRATION RATE: 20 BRPM | SYSTOLIC BLOOD PRESSURE: 118 MMHG | BODY MASS INDEX: 61.84 KG/M2 | WEIGHT: 315 LBS | HEIGHT: 60 IN | DIASTOLIC BLOOD PRESSURE: 72 MMHG | HEART RATE: 72 BPM | TEMPERATURE: 98.5 F

## 2020-10-28 DIAGNOSIS — D50.8 OTHER IRON DEFICIENCY ANEMIA: Primary | ICD-10-CM

## 2020-10-28 PROBLEM — D64.9 ABSOLUTE ANEMIA: Status: ACTIVE | Noted: 2020-10-28

## 2020-10-28 PROCEDURE — 99203 OFFICE O/P NEW LOW 30 MIN: CPT | Performed by: SURGERY

## 2020-10-28 NOTE — PROGRESS NOTES
Ventura Russell 35 y.o. male presents @ the req of [facility] for eval of anemia/EGD/C-SCOPE.   Chief Complaint   Patient presents with   • EGD   • Colonoscopy   • Anemia             HPI   Above-noted agree.  This very sweet 35-year-old male was referred here for anemia.  He has never had an EGD or colonoscopy before.  He says he does get heartburn occasionally and he does have a hard time moving his bowels.  He has no abdominal pain.  He has no chest pain or shortness of breath.  His mom said he has put on quite a bit of weight recently.  On discussion with the patient and his mother it turns out he has been drinking quite a bit of cherry Coke.  He does not smoke or use tobacco products.  He has no other complaints.      Review of Systems   All other systems reviewed and are negative.            Current Outpatient Medications:   •  cetirizine (zyrTEC) 10 MG tablet, Take 10 mg by mouth Daily., Disp: , Rfl:   •  citalopram (CeleXA) 40 MG tablet, Take  by mouth daily., Disp: , Rfl:   •  divalproex (DEPAKOTE) 500 MG DR tablet, Take 2 tablets by mouth 2 (Two) Times a Day. 2 bid, Disp: 120 tablet, Rfl: 11  •   MG capsule, TK 1 C PO BID PRF CONSTIPATION, Disp: , Rfl:   •  ferrous sulfate 325 (65 FE) MG tablet, Take 325 mg by mouth 2 (Two) Times a Day., Disp: , Rfl:   •  hydroCHLOROthiazide (HYDRODIURIL) 12.5 MG tablet, Take  by mouth Daily., Disp: , Rfl:   •  Magnesium 250 MG tablet, Take  by mouth Daily., Disp: , Rfl:   •  omeprazole (priLOSEC) 20 MG capsule, Take 20 mg by mouth Daily., Disp: , Rfl:   •  phenytoin (DILANTIN) 100 MG ER capsule, Take 3 capsules by mouth 2 (Two) Times a Day. 2 am, 3 hs, Disp: 180 capsule, Rfl: 11  •  traZODone (DESYREL) 100 MG tablet, Take 300 mg by mouth every night., Disp: , Rfl:   •  zonisamide (ZONEGRAN) 100 MG capsule, Take 2 capsules by mouth 2 (Two) Times a Day., Disp: 120 capsule, Rfl: 11        Allergies   Allergen Reactions   • Bactrim [Sulfamethoxazole-Trimethoprim]  "Mental Status Change           Past Medical History:   Diagnosis Date   • Bipolar 1 disorder (CMS/HCC)    • Depression    • Epilepsy (CMS/HCC)    • Seizures (CMS/HCC)            Past Surgical History:   Procedure Laterality Date   • NO PAST SURGERIES     • TONSILLECTOMY             Social History     Tobacco Use   • Smoking status: Never Smoker   • Smokeless tobacco: Never Used   Substance Use Topics   • Alcohol use: No   • Drug use: No             There is no immunization history on file for this patient.        Physical Exam  Vitals signs and nursing note reviewed.   Constitutional:       Appearance: Normal appearance. He is obese.   HENT:      Head: Normocephalic and atraumatic.   Cardiovascular:      Rate and Rhythm: Normal rate and regular rhythm.   Pulmonary:      Effort: Pulmonary effort is normal.      Breath sounds: Normal breath sounds.   Abdominal:      General: Bowel sounds are normal.      Palpations: Abdomen is soft.   Musculoskeletal: Normal range of motion.   Skin:     General: Skin is warm and dry.   Neurological:      General: No focal deficit present.      Mental Status: He is alert and oriented to person, place, and time.   Psychiatric:         Mood and Affect: Mood normal.         Behavior: Behavior normal.         Debilities/Disabilities Identified: None    Emotional Behavior: Appropriate      /72   Pulse 72   Temp 98.5 °F (36.9 °C)   Resp 20   Ht 152.4 cm (60\")   Wt (!) 165 kg (363 lb)   BMI 70.89 kg/m²         Diagnoses and all orders for this visit:    1. Other iron deficiency anemia (Primary)    We will get Bud scheduled for an EGD and colonoscopy.  I discussed with the patient the benefits and risks of performing endoscopy.  Benefits and risks were not limited to but including bleeding, infection, perforation, complications of anesthesia, aspiration.  The patient appeared to understand and is willing to proceed.    Thank you for allowing me to participate in the care of this " interesting patient.

## 2020-10-30 ENCOUNTER — TELEPHONE (OUTPATIENT)
Dept: GASTROENTEROLOGY | Facility: CLINIC | Age: 35
End: 2020-10-30

## 2020-11-16 ENCOUNTER — TRANSCRIBE ORDERS (OUTPATIENT)
Dept: ADMINISTRATIVE | Facility: HOSPITAL | Age: 35
End: 2020-11-16

## 2020-11-16 DIAGNOSIS — U07.1 COVID-19: Primary | ICD-10-CM

## 2020-11-21 ENCOUNTER — LAB (OUTPATIENT)
Dept: LAB | Facility: HOSPITAL | Age: 35
End: 2020-11-21

## 2020-11-21 DIAGNOSIS — U07.1 COVID-19: ICD-10-CM

## 2020-11-21 PROCEDURE — U0004 COV-19 TEST NON-CDC HGH THRU: HCPCS | Performed by: OBSTETRICS & GYNECOLOGY

## 2020-11-21 PROCEDURE — C9803 HOPD COVID-19 SPEC COLLECT: HCPCS

## 2020-11-23 ENCOUNTER — TELEPHONE (OUTPATIENT)
Dept: SURGERY | Facility: CLINIC | Age: 35
End: 2020-11-23

## 2020-11-23 LAB — SARS-COV-2 RNA RESP QL NAA+PROBE: DETECTED

## 2020-11-23 NOTE — TELEPHONE ENCOUNTER
Ventura is scheduled for procedures tomorrow; however, has tested + for COVID.  Procedures will be cancelled; Dr. Cardenas to call patient's mother and advise.

## 2021-02-23 ENCOUNTER — TELEPHONE (OUTPATIENT)
Dept: NEUROLOGY | Facility: CLINIC | Age: 36
End: 2021-02-23

## 2021-02-23 NOTE — TELEPHONE ENCOUNTER
Provider: DR. NOONAN  Caller: FLORENTINO COWART  Relationship to Patient: PT'S MOTHER      Reason for Call: PT'S MOTHER CALLING ASKING IF THE PT CAN HAVE THE COVID VACCINE WITH THE MEDICATION AND HIS HEALTH PROBLEMS. SHE JUST WANTS TO MAKE SURE IT'S OK.    PLEASE CALL HER BACK [phone]

## 2021-02-24 NOTE — TELEPHONE ENCOUNTER
This is a previous Jevon seizure patient. Pt's mother is wanting to know if in your opinion the Covid Vaccine is safe for pt. He is currently prescribed depakote, dilantin, and zonegran from our office.    Please review and advise.  Thank you

## 2021-03-02 ENCOUNTER — TELEPHONE (OUTPATIENT)
Dept: NEUROLOGY | Facility: CLINIC | Age: 36
End: 2021-03-02

## 2021-03-02 NOTE — TELEPHONE ENCOUNTER
Received a call from pt's primary care stating that pt's mother was complaining to them of increased in seizure activity. They stated that pt's recent dilantin level is a 5 therapeutic range is 10 - 20 ug/mL. I informed her that we were not made aware of increase in seizure activity and that pt's mother just needed to call the office to see if a sooner appt is available for us to move him up. She asked if we did not hear from her soon to reach out to her to get him scheduled.

## 2021-03-04 NOTE — TELEPHONE ENCOUNTER
Spoke to pt's mother. She states that pt's levels are usually around that level, and she believes he is okay. The only reason they have noticed a slight increase in activity she believes is due to stressful situations they are having with other family members health. She said at this time they are okay to keep Bouchra appt but will absolutely call if she feels a sooner appt is warranted.

## 2021-04-06 RX ORDER — ZONISAMIDE 100 MG/1
200 CAPSULE ORAL 2 TIMES DAILY
Qty: 120 CAPSULE | Refills: 11 | Status: SHIPPED | OUTPATIENT
Start: 2021-04-06 | End: 2021-12-29 | Stop reason: SDUPTHER

## 2021-06-14 ENCOUNTER — TELEPHONE (OUTPATIENT)
Dept: NEUROLOGY | Facility: CLINIC | Age: 36
End: 2021-06-14

## 2021-06-14 ENCOUNTER — OFFICE VISIT (OUTPATIENT)
Dept: NEUROLOGY | Facility: CLINIC | Age: 36
End: 2021-06-14

## 2021-06-14 VITALS
HEART RATE: 90 BPM | BODY MASS INDEX: 61.84 KG/M2 | WEIGHT: 315 LBS | SYSTOLIC BLOOD PRESSURE: 128 MMHG | OXYGEN SATURATION: 98 % | DIASTOLIC BLOOD PRESSURE: 82 MMHG | HEIGHT: 60 IN

## 2021-06-14 DIAGNOSIS — G40.919 INTRACTABLE EPILEPSY WITHOUT STATUS EPILEPTICUS, UNSPECIFIED EPILEPSY TYPE (HCC): Primary | ICD-10-CM

## 2021-06-14 DIAGNOSIS — Z79.899 HIGH RISK MEDICATION USE: ICD-10-CM

## 2021-06-14 DIAGNOSIS — M85.88 OTHER SPECIFIED DISORDERS OF BONE DENSITY AND STRUCTURE, OTHER SITE: ICD-10-CM

## 2021-06-14 PROCEDURE — 99215 OFFICE O/P EST HI 40 MIN: CPT | Performed by: PSYCHIATRY & NEUROLOGY

## 2021-06-14 RX ORDER — CLONAZEPAM 0.5 MG/1
TABLET, ORALLY DISINTEGRATING ORAL
Qty: 30 TABLET | Refills: 5 | Status: SHIPPED | OUTPATIENT
Start: 2021-06-14 | End: 2022-07-06 | Stop reason: SDUPTHER

## 2021-06-14 RX ORDER — HALOPERIDOL 2 MG/1
2 TABLET ORAL 2 TIMES DAILY
COMMUNITY

## 2021-06-14 RX ORDER — DIVALPROEX SODIUM 500 MG/1
1000 TABLET, DELAYED RELEASE ORAL 2 TIMES DAILY
Qty: 120 TABLET | Refills: 11 | Status: SHIPPED | OUTPATIENT
Start: 2021-06-14 | End: 2022-02-09

## 2021-06-14 NOTE — TELEPHONE ENCOUNTER
Provider: SARAN  Caller: HESHAM COLON/[facility]  Relationship to Patient: N/A  Pharmacy: N/A  Phone Number: [phone]  Reason for Call: HOSPITAL HAS NO RECORDS OF PT GETTING ANY EEG WITH THEM FOR THE PAST 5 YEARS.    THANK YOU.

## 2021-06-14 NOTE — PATIENT INSTRUCTIONS
[facility]  Chioma Coles MD  Neurology clinic  [phone]    With anti-seizure medications, you may initially notice side effects of fatigue, drowsiness, unsteadiness, and dizziness.  Other possible side effects include nausea, abdominal pain, headache, blurry or double vision, slurred speech and mood changes.  Generally, patients will noticed these symptoms when the medication is first started or with higher doses and will go away with time.    It is import to consistently take your medication every day.  Missing just one dose may put you at risk for a breakthrough seizure.  Consider using reminders on your phone or a pill box.    If you develop a rash, please call the neurology clinic immediately or notify another healthcare professional, as this may be potentially life-threatening.  If you are unable to reach a healthcare professional, go to the emergency room immediately for further evaluation.    If you develop thoughts of wanting to hurt yourself or others, please call the neurology clinic immediately to notify another healthcare professional.  If you are unable to reach a healthcare professional, go to the emergency room immediately for further evaluation.    It is the [place] state law that you cannot drive within 90 days of a seizure.    You should avoid certain activities that if you were to have a seizure, you could harm yourself or others. In general, it is recommended that you avoid operating heavy machinery or power tools, swimming or taking baths by yourself (showers are ok), don't stand over open flames, don't get on high ladders or the roof.  I also recommend to avoid sleeping on your stomach.    For further information on epilepsy and resources available to patients and their families, please visit the Epilepsy Foundation of [facility] at www.efky.org or call [phone].    **Check out the Epilepsy [facility] of [facility]'s monthly Art Group Gathering.  They are  located at [facility], [address] [place] [address].  Call Violeta Navas at [phone] or email her at [email or web address] for the dates of future gatherings.**      **If you have having memory problems, consider HOBSCOTCH (Home-Based Self-management and Cognitive Training Changes lives).  It is an 8 week self-management program for adults with epilepsy and memory problems.  The program is free at the Epilepsy Foundation [facility].  Contact Kitty Herrmann at [phone] or [email or web address].**

## 2021-06-14 NOTE — PROGRESS NOTES
Subjective:     Patient ID: Ventura Russell is a 36 y.o. male.    Corwin is a 36-year-old right-handed male with history of bipolar, intellectual disability, obesity, hypertension, GERD, sleep apnea on CPAP, and insomnia who presents to the neurology clinic today as a new patient to me for the evaluation of seizures.  The patient is accompanied by his mother today who is his guardian.  They live with the patient's brother and sister-in-law as well as the patient's nephew.  The sister-in-law helps with medications.  Seizures since birth.  Still has seizures.  Mom's pregnancy was not complicated.  Had meconium staining at birth.  He was full term.  Had a vaginal birth.  Stayed in the hospital for a month because of the seizures.  No family history of seizures.  May go months without seizures and may have up to 4 in a day.  Has a seizure calendar at home, but didn't bring it in with her today.  Mom was out of the town for the past 3 weeks and he had 3.  Last seizure was 2 days ago.  Triggered by being overexcited and depressed.  Not sure how many in the past year.  Maybe 25.  Usually he will have 2-3 in a day.  Does not have a rescue medication.  Not at a particular time of day.  They usually stop on their own.  Has associated urinary incontinence.  Has broken a tooth from a seizure.  Feels funny before a seizure and feels shaking in hands.  Does have LOC.  Has whole body shaking.  Will get mean afterwards.  May turn blue and eyes will deviate to the right.  Has bitten his tongue.  Is currently on zonisamide 200 mg BID.  Has been on it for a long time.  Has been on higher doses.  It was decreased because of seizure control.  No side effects.  Is also phenytoin  mg qam and 300 mg qhs.  Not sure how long he has been on this med.  Not sure of higher doses.  No side effects.  Is also on VPA DR 1,000 mg BID.  Has been on this med for years.  Not sure if higher doses.  No side effects.  Took his meds this morning at  around 9:30/10 am.  Usually takes around 7/8 am.  The meds are affordable.  Has tried other meds in the past, but not sure.  Maybe PB.  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  Does not drive.  Had an MRI a long time ago.  Not sure when or where.  May have mixing of gray and white matter.  Last EEG was 3-4 years ago.  Not sure what it showed.  That was done at [facility].  Has never had an EEG that lasted more than a day.  He has a hard time being still.  Has never had a DEXA scan.  Blood work has been done at [facility] as well.  It was about 2 months ago.  We received blood work that was done on February 9, 2021.  His CMP was essentially normal.  His chemistry was normal however his Dilantin level was low at 5.    Risk factors:  Head trauma/pathology? no  CNS infections? No              The following portions of the patient's history were reviewed and updated as appropriate: allergies, current medications, past family history, past medical history, past social history, past surgical history and problem list.    Review of Systems     Objective:    Neurologic Exam    Physical Exam   **The patient is wearing a mask**  Constitutional:  Vital signs reviewed.  No apparent distress.  Well groomed.  Eyes:  No injection, no icterus.    Respiratory:  Normal effort.  Clear to auscultation bilaterally.  Cardiovascular:  Regular rate and rhythm.  No murmurs.  No carotid bruits. Symmetric radial pulses.  Musculoskeletal: Normal station.  Gait steady.  Normal arm swing.  Patient able to walk on heels and toes.  He has difficulty with tandem gait.  Romberg negative.  Muscle tone and bulk normal in the bilateral upper and lower extremities.  Strength is 5/5 in the bilateral upper and lower extremities proximally and distally unless otherwise specified in the neurological exam.  Skin:  No rashes.  Warm, dry, and intact.  Psychiatric:  Good mood.  Normal affect.    Neurologic:  Mental status-  The  patient is alert and oriented to person only.  Attention/concentration is limited.  Speech is fluent without dysarthria.  The patient is able to name and follow complex commands without difficulty.  He has some difficulty with repetition.  Immediate memory intact.  He recalled 1 of 3 words after 4 minutes.  Fund of knowledge is limited.  Cranial nerves- Pupils equally round and reactive to light with intact accomodation.  Visual fields intact.  Extraocular movements intact.  Facial sensation intact.   Hearing intact to finger-rub bilaterally.  SCM and trapezius are 5/5 bilaterally.    Motor-  See musculoskeletal above.  Tremor in both hands bilaterally.  Reflexes-limited by body habitus.  Everything is symmetric.  Sensation- Intact to pinprick and vibration in bilateral upper and lower extremities symmetrically.  Coordination-heel-knee-shin is limited by body habitus.  Gait- See musculoskeletal exam above.       Assessment/Plan:    But is a 36-year-old right-handed male with history of bipolar, intellectual disability, obesity, hypertension, GERD, sleep apnea on CPAP, insomnia who presents today for evaluation of seizures.    1.  Epilepsy-it may be genetic due to age of onset or possibly metabolic given his history of intellectual disability.  I do not have access to his MRI.  If it is normal, he may also have unknown epilepsy.  It is difficult to conclude if he has focal onset or primary generalized seizures.  We will try to get the results of his EEG that was done previously.  Unfortunately he continues to have uncontrolled seizures.  I would like to prescribe clonazepam as a rescue medicine to prevent further clusters.  For drug monitoring I would like to get a DEXA scan as well as a folate and Depakote levels.  The blood draws can be done at his next visit.  Initially I wanted to target weaning Depakote due to possible side effects of weight gain however it may be helping his mood.  Because of mood issues I  would like to avoid levetiracetam and perampanel.  I would like to avoid the combination of Depakote and lamotrigine.  Ultimately I would like to switch phenytoin to Vimpat.  They were given samples of 50 mg twice a day for 2 weeks.  After a week we will check in with him.  If he is doing okay we will call in a prescription.  At his next visit we may want to consider further increasing the Vimpat or possibly decreasing the Dilantin.    [place] has not tried the following:  Levetiracetam  Lamotrigine   Clobazam   Perampanel   Carbamazepine   Oxcarbazepine/Oxtellar   Lacosamide   Eslicarbazepine   Xcopri    A total of 40 minutes of time was spent on this encounter today.  This included reviewing the patient's records, face-to-face time, and documentation.     Problems Addressed this Visit     None      Visit Diagnoses     Intractable epilepsy without status epilepticus, unspecified epilepsy type (CMS/HCC)    -  Primary    Relevant Medications    clonazePAM (KlonoPIN) 0.5 MG disintegrating tablet    Other Relevant Orders    DEXA Bone Density Axial    High risk medication use        Relevant Orders    DEXA Bone Density Axial    Other specified disorders of bone density and structure, other site         Relevant Orders    DEXA Bone Density Axial      Diagnoses       Codes Comments    Intractable epilepsy without status epilepticus, unspecified epilepsy type (CMS/HCC)    -  Primary ICD-10-CM: G40.919  ICD-9-CM: 345.91     High risk medication use     ICD-10-CM: Z79.899  ICD-9-CM: V58.69     Other specified disorders of bone density and structure, other site      ICD-10-CM: M85.88  ICD-9-CM: 733.99

## 2021-06-22 ENCOUNTER — TELEPHONE (OUTPATIENT)
Dept: NEUROLOGY | Facility: CLINIC | Age: 36
End: 2021-06-22

## 2021-06-22 DIAGNOSIS — G40.919 INTRACTABLE EPILEPSY WITHOUT STATUS EPILEPTICUS, UNSPECIFIED EPILEPSY TYPE (HCC): Primary | ICD-10-CM

## 2021-06-22 NOTE — TELEPHONE ENCOUNTER
Attempted to call patient to discuss. No answer. Left msg requesting a call back. Please transfer to office.

## 2021-06-22 NOTE — TELEPHONE ENCOUNTER
Can you check in and see how this patient is doing on Vimpat 50 milligrams twice a day?  Is he having any side effects?  Would they like for me to send in a prescription?  Thanks!

## 2021-06-24 ENCOUNTER — APPOINTMENT (OUTPATIENT)
Dept: BONE DENSITY | Facility: HOSPITAL | Age: 36
End: 2021-06-24

## 2021-06-24 DIAGNOSIS — G40.919 INTRACTABLE EPILEPSY WITHOUT STATUS EPILEPTICUS, UNSPECIFIED EPILEPSY TYPE (HCC): ICD-10-CM

## 2021-06-24 DIAGNOSIS — Z79.899 HIGH RISK MEDICATION USE: ICD-10-CM

## 2021-06-24 DIAGNOSIS — M85.88 OTHER SPECIFIED DISORDERS OF BONE DENSITY AND STRUCTURE, OTHER SITE: ICD-10-CM

## 2021-06-24 PROCEDURE — 77080 DXA BONE DENSITY AXIAL: CPT

## 2021-06-24 RX ORDER — LACOSAMIDE 50 MG/1
50 TABLET ORAL EVERY 12 HOURS SCHEDULED
Qty: 60 TABLET | Refills: 5 | Status: SHIPPED | OUTPATIENT
Start: 2021-06-24 | End: 2022-01-10

## 2021-06-24 NOTE — TELEPHONE ENCOUNTER
Was able to reach patient's mother. She said really there is nothing different, no improvement or worsening. She said there are no side effects that she has noticed either. She is okay with you sending a prescription to the pharmacy.     Please review.   Thank you

## 2021-06-25 ENCOUNTER — TELEPHONE (OUTPATIENT)
Dept: NEUROLOGY | Facility: CLINIC | Age: 36
End: 2021-06-25

## 2021-06-25 NOTE — TELEPHONE ENCOUNTER
----- Message from Chioma Coles MD sent at 6/24/2021  1:38 PM EDT -----  Can you let this patient know that his DEXA scan does show significant bone loss?  This may be from his seizure medications.  He do not need to make any changes today but we probably should discuss possibly making medication changes at his next clinic visit.  I also recommend following up with his primary care provider as well.

## 2021-06-25 NOTE — TELEPHONE ENCOUNTER
Spoke to patient's mother. Results and recommendations relayed. She stated understanding. Results faxed to requested pcp, [facility] at [phone].

## 2021-06-25 NOTE — TELEPHONE ENCOUNTER
Attempted to call patient's mother to discuss results and recommendations. No answer. LM for her to return call. Please transfer to office if she calls back.

## 2021-08-20 RX ORDER — PHENYTOIN SODIUM 100 MG/1
CAPSULE, EXTENDED RELEASE ORAL
Qty: 150 CAPSULE | Refills: 11 | Status: SHIPPED | OUTPATIENT
Start: 2021-08-20 | End: 2022-07-06 | Stop reason: SDUPTHER

## 2021-12-29 RX ORDER — ZONISAMIDE 100 MG/1
200 CAPSULE ORAL 2 TIMES DAILY
Qty: 120 CAPSULE | Refills: 11 | Status: SHIPPED | OUTPATIENT
Start: 2021-12-29 | End: 2022-07-06 | Stop reason: SDUPTHER

## 2021-12-29 NOTE — TELEPHONE ENCOUNTER
Caller: FLORENTINO COWART    Relationship: PT'S MOTHER    Best call back number:  [phone]    Requested Prescriptions: zonisamide (ZONEGRAN) 100 MG capsule  Requested Prescriptions      No prescriptions requested or ordered in this encounter        Pharmacy where request should be sent:  [facility]    Additional details provided by patient: PT WILL BE OUT OF THE MEDICATION  zonisamide (ZONEGRAN) 100 MG capsule THIS COMING Saturday 1-1-2022    Does the patient have less than a 3 day supply:  [x] Yes  [] No    Dex Alfaro Rep   12/29/21 12:27 EST

## 2022-01-10 DIAGNOSIS — G40.919 INTRACTABLE EPILEPSY WITHOUT STATUS EPILEPTICUS, UNSPECIFIED EPILEPSY TYPE: ICD-10-CM

## 2022-01-10 RX ORDER — LACOSAMIDE 50 MG
TABLET ORAL
Qty: 60 TABLET | Refills: 5 | Status: SHIPPED | OUTPATIENT
Start: 2022-01-10 | End: 2022-05-17

## 2022-02-09 RX ORDER — DIVALPROEX SODIUM 500 MG/1
TABLET, DELAYED RELEASE ORAL
Qty: 120 TABLET | Refills: 11 | Status: SHIPPED | OUTPATIENT
Start: 2022-02-09 | End: 2022-07-06 | Stop reason: SDUPTHER

## 2022-02-11 ENCOUNTER — OFFICE VISIT (OUTPATIENT)
Dept: NEUROLOGY | Facility: CLINIC | Age: 37
End: 2022-02-11

## 2022-02-11 VITALS
BODY MASS INDEX: 61.84 KG/M2 | SYSTOLIC BLOOD PRESSURE: 130 MMHG | HEART RATE: 74 BPM | HEIGHT: 60 IN | DIASTOLIC BLOOD PRESSURE: 74 MMHG | OXYGEN SATURATION: 98 % | WEIGHT: 315 LBS

## 2022-02-11 DIAGNOSIS — Z79.899 HIGH RISK MEDICATION USE: ICD-10-CM

## 2022-02-11 DIAGNOSIS — G40.919 INTRACTABLE EPILEPSY WITHOUT STATUS EPILEPTICUS, UNSPECIFIED EPILEPSY TYPE: Primary | ICD-10-CM

## 2022-02-11 PROCEDURE — 99214 OFFICE O/P EST MOD 30 MIN: CPT | Performed by: PSYCHIATRY & NEUROLOGY

## 2022-02-11 RX ORDER — BENZTROPINE MESYLATE 0.5 MG/1
TABLET ORAL
COMMUNITY
Start: 2022-02-09

## 2022-02-11 RX ORDER — PNV NO.95/FERROUS FUM/FOLIC AC 28MG-0.8MG
1 TABLET ORAL DAILY
COMMUNITY
Start: 2021-12-30 | End: 2022-05-17 | Stop reason: SDUPTHER

## 2022-02-11 RX ORDER — ONDANSETRON 4 MG/1
TABLET, FILM COATED ORAL
COMMUNITY
Start: 2021-12-06

## 2022-02-11 RX ORDER — FEXOFENADINE HYDROCHLORIDE 60 MG/1
60 TABLET, FILM COATED ORAL DAILY
COMMUNITY

## 2022-02-11 NOTE — PATIENT INSTRUCTIONS
**Decrease dilantin to 2 tabs twice daily.    [facility]  Chioma Coles MD  Neurology clinic  [phone]    With anti-seizure medications, you may initially notice side effects of fatigue, drowsiness, unsteadiness, and dizziness.  Other possible side effects include nausea, abdominal pain, headache, blurry or double vision, slurred speech and mood changes.  Generally, patients will noticed these symptoms when the medication is first started or with higher doses and will go away with time.    It is import to consistently take your medication every day.  Missing just one dose may put you at risk for a breakthrough seizure.  Consider using reminders on your phone or a pill box.    If you develop a rash, please call the neurology clinic immediately or notify another healthcare professional, as this may be potentially life-threatening.  If you are unable to reach a healthcare professional, go to the emergency room immediately for further evaluation.    If you develop thoughts of wanting to hurt yourself or others, please call the neurology clinic immediately to notify another healthcare professional.  If you are unable to reach a healthcare professional, go to the emergency room immediately for further evaluation.    It is the [place] state law that you cannot drive within 90 days of a seizure.    You should avoid certain activities that if you were to have a seizure, you could harm yourself or others. In general, it is recommended that you avoid operating heavy machinery or power tools, swimming or taking baths by yourself (showers are ok), don't stand over open flames, don't get on high ladders or the roof.  I also recommend to avoid sleeping on your stomach.    For further information on epilepsy and resources available to patients and their families, please visit the Epilepsy Foundation of \[facility]\"" at www.efky.org or call [phone].    **Check out the Epilepsy Foundation   [facility]'s monthly Art Group Gathering.  They are located at [facility], [address] [place] [address].  Call Violeta Navas at [phone] or email her at [email or web address] for the dates of future gatherings.**      **If you have having memory problems, consider HOBSCOTCH (Home-Based Self-management and Cognitive Training Changes lives).  It is an 8 week self-management program for adults with epilepsy and memory problems.  The program is free at the Epilepsy Foundation [facility].  Contact Kitty Herrmann at [phone] or [email or web address].**

## 2022-02-11 NOTE — PROGRESS NOTES
Subjective:     Patient ID: Ventura Russell is a 36 y.o. male.    The patient is a 36-year-old right-handed male with history of bipolar, intellectual disability, obesity, hypertension, GERD, sleep apnea, and insomnia who presents to the neurology clinic today as established patient for follow-up for seizures. The patient was last seen as a new patient on June 14 of 2021. For details regarding his history, please refer to that note. The patient presents with his mother today who is his guardian. He has had seizures since birth. The exact frequency is unknown. He may go months without seizures but then may have up to 4 and a day. His last seizure was about 2 months ago. They have been using as needed clonazepam. When he takes it he does not have any further seizures that day. He is currently on zonisamide 200 mg twice a day, phenytoin  mg in the morning and 300 mg at night, Depakote DR 1000 g twice a day and recently added Vimpat 50 mg twice a day. They deny side effects of the Vimpat and reports the medications are affordable. Apparently he does have a bilateral hand tremor that he has had for a while. Is not currently preventing him from doing anything. Apparently his psychiatrist has been changing his medications to help with that. He did have blood work done recently at [facility]. We did a DEXA scan on June 24, 2021 that showed osteoporosis. They have not spoken with the primary care physician about this. Overall the seizures are better and less frequent.    The following portions of the patient's history were reviewed and updated as appropriate: allergies, current medications, past family history, past medical history, past social history, past surgical history and problem list.    Review of Systems     Objective:    Neurologic Exam    Physical Exam   The patient does have a postural tremor in the bilateral upper extremities.    Assessment/Plan:    The patient is a 36-year-old  right-handed male with history of bipolar, intellectual disability, obesity, hypertension, GERD, sleep apnea, and insomnia who presents today for follow-up.    1. Intractable epilepsy-it is difficult include if it is genetic, metabolic, or unknown. Fortunately his seizures have gotten better with the Vimpat. Because of the osteoporosis, long-term I would like to get rid of Dilantin and Depakote if possible. Mom was agreeable to decreasing Dilantin today to 2 tabs twice a day. Hopefully we can continue to increase the Vimpat and lower the Dilantin.  Clobazam could be an good option as well. He has not tried levetiracetam, lamotrigine, Fycompa, Tegretol, Trileptal, Aptiom, or Xcopri. In the past I was concerned about possible mood issues with Keppra or Fycompa. I will see the patient back in 3 months or sooner if needed. He may also be a VNS candidate as well.    A total of 30 minutes of time was spent on this encounter today. This includes reviewing the patient's records, face-to-face time, documentation.       Problems Addressed this Visit     None      Visit Diagnoses     Intractable epilepsy without status epilepticus, unspecified epilepsy type (HCC)    -  Primary    High risk medication use          Diagnoses       Codes Comments    Intractable epilepsy without status epilepticus, unspecified epilepsy type (HCC)    -  Primary ICD-10-CM: G40.919  ICD-9-CM: 345.91     High risk medication use     ICD-10-CM: Z79.899  ICD-9-CM: V58.69            **We received blood work that was completed on December 27, 2021.  The patient's CBC was normal.  Iron studies were normal.  Folic acid was slightly low at 4.68.  Vitamin B12 was 457.  His hemoglobin A1c was 5.5.

## 2022-05-17 ENCOUNTER — OFFICE VISIT (OUTPATIENT)
Dept: NEUROLOGY | Facility: CLINIC | Age: 37
End: 2022-05-17

## 2022-05-17 VITALS
OXYGEN SATURATION: 99 % | SYSTOLIC BLOOD PRESSURE: 136 MMHG | DIASTOLIC BLOOD PRESSURE: 76 MMHG | HEART RATE: 76 BPM | WEIGHT: 315 LBS | BODY MASS INDEX: 61.84 KG/M2 | HEIGHT: 60 IN

## 2022-05-17 DIAGNOSIS — G40.919 INTRACTABLE EPILEPSY WITHOUT STATUS EPILEPTICUS, UNSPECIFIED EPILEPSY TYPE: Primary | ICD-10-CM

## 2022-05-17 PROCEDURE — 99214 OFFICE O/P EST MOD 30 MIN: CPT | Performed by: PSYCHIATRY & NEUROLOGY

## 2022-05-17 RX ORDER — LACOSAMIDE 100 MG/1
TABLET ORAL
Qty: 60 TABLET | Refills: 5 | Status: SHIPPED | OUTPATIENT
Start: 2022-05-17 | End: 2022-07-06 | Stop reason: SDUPTHER

## 2022-05-17 RX ORDER — GUANFACINE 1 MG/1
1 TABLET ORAL 2 TIMES DAILY
COMMUNITY
Start: 2022-04-30

## 2022-05-17 RX ORDER — FLUTICASONE PROPIONATE 50 MCG
SPRAY, SUSPENSION (ML) NASAL
COMMUNITY
Start: 2022-03-07

## 2022-05-17 RX ORDER — ALENDRONATE SODIUM 70 MG/1
TABLET ORAL
COMMUNITY
Start: 2022-05-05

## 2022-05-17 NOTE — PATIENT INSTRUCTIONS
Increase vimpat from 50 mg twice a day to 50 mg in the morning and 100 mg at night for 2 weeks, then increase to 100 mg twice a day.      [facility]  Chioma Coles MD  Neurology clinic  [phone]    With anti-seizure medications, you may initially notice side effects of fatigue, drowsiness, unsteadiness, and dizziness.  Other possible side effects include nausea, abdominal pain, headache, blurry or double vision, slurred speech and mood changes.  Generally, patients will noticed these symptoms when the medication is first started or with higher doses and will go away with time.    It is import to consistently take your medication every day.  Missing just one dose may put you at risk for a breakthrough seizure.  Consider using reminders on your phone or a pill box.    If you develop a rash, please call the neurology clinic immediately or notify another healthcare professional, as this may be potentially life-threatening.  If you are unable to reach a healthcare professional, go to the emergency room immediately for further evaluation.    If you develop thoughts of wanting to hurt yourself or others, please call the neurology clinic immediately to notify another healthcare professional.  If you are unable to reach a healthcare professional, go to the emergency room immediately for further evaluation.    It is the [place] state law that you cannot drive within 90 days of a seizure.    You should avoid certain activities that if you were to have a seizure, you could harm yourself or others. In general, it is recommended that you avoid operating heavy machinery or power tools, swimming or taking baths by yourself (showers are ok), don't stand over open flames, don't get on high ladders or the roof.  I also recommend to avoid sleeping on your stomach.    For further information on epilepsy and resources available to patients and their families, please visit the [facility]  at [email or web address] or call [phone].    **Check out the [facility]'s monthly Art Group Gathering.  They are [facility], [address] [place] [address].  Call Violeta Eloise at [phone] or email her at [email or web address] for the dates of future gatherings.**      **If you have having memory problems, consider HOBSCOTCH (Home-Based Self-management and Cognitive Training Changes lives).  It is an 8 week self-management program for adults with epilepsy and memory problems.  The program is free at the [facility].  Contact Kitty Herrmann at [phone] or [email or web address].**

## 2022-05-17 NOTE — PROGRESS NOTES
Subjective:     Patient ID: Ventura Russell is a 37 y.o. male.    The patient is a 37-year-old right-handed male with a history of bipolar, intellectual disability, obesity, hypertension, GERD, sleep apnea, and insomnia who presents to the neurology clinic today as an established patient for follow-up for seizures.  The patient was last seen on February 11, 2022.  He is new patient to me on June 14, 2021.  For details regarding his history, please refer to that note.  The patient presents with his mother today.  She is his guardian.  He has had seizures since birth.  His seizures are quite sporadic.  He had 2 in February, 0 in March, 8 in April and 3 so far in May.  His last one was last week.  Mom thinks that his seizures are about the same.  He is currently on zonisamide 200 mg twice a day, phenytoin 200 mg twice a day, Depakote DR 1000 g twice a day and Vimpat 50 mg twice a day.  The Vimpat was most recently added.  Because of osteoporosis seen on a DEXA scan in 2021 I been wanting to transition his Dilantin to Vimpat.  He has not tried clobazam, lamotrigine, Tegretol, Trileptal, Aptiom or Xcopri in the past.  I wanted to avoid Keppra and Fycompa due to potential mood side effects.  I also felt that he may be a good VNS candidate.  They report that they may be moving to [place] soon.  They deny side effects the medicine reports that they are affordable.    The following portions of the patient's history were reviewed and updated as appropriate: allergies, current medications, past family history, past medical history, past social history, past surgical history and problem list.    Review of Systems     Objective:    Neurologic Exam    Physical Exam    Assessment/Plan:    The patient is a 37-year-old right-handed male with history of bipolar, intellectual disability, obesity, hypertension, GERD, sleep apnea, and insomnia who presents today for follow-up.    1.  Intractable epilepsy-it has been difficult for me  to conclude if he has genetic, metabolic or unknown epilepsy.  They felt like his seizures got better with Vimpat.  Fortunately, his seizures have been about the same with lowering of his Dilantin.  I recommend increasing the Vimpat to 50 mg in the morning and 100 mg at night for 2 weeks then 100 mg twice a day.  At his next follow-up if he is doing okay we can further try to decrease his Dilantin.  Optimally I would like to get him off of Depakote as well because of the osteoporosis however it likely is helping with his seizures as well as his mood.  Maybe we can try to get him on the lowest dose as possible.  If they do move to outside of [place] we discussed that [facility] likely has epilepsy specialist that he could transition to.  Otherwise we will see him back in 3 months or sooner if needed.    A total of 30 minutes of time was spent on this encounter today.  This includes reviewing the patient's records, face-to-face time, and documentation.       Problems Addressed this Visit    None     Visit Diagnoses     Intractable epilepsy without status epilepticus, unspecified epilepsy type (HCC)    -  Primary    Relevant Medications    lacosamide (VIMPAT) 100 MG tablet tablet      Diagnoses       Codes Comments    Intractable epilepsy without status epilepticus, unspecified epilepsy type (HCC)    -  Primary ICD-10-CM: G40.919  ICD-9-CM: 345.91

## 2022-05-18 ENCOUNTER — TELEPHONE (OUTPATIENT)
Dept: NEUROLOGY | Facility: CLINIC | Age: 37
End: 2022-05-18

## 2022-05-18 NOTE — TELEPHONE ENCOUNTER
Need name of facility that he recently had labs drawn at , per patient's mother he went to [facility] , however, per the hospital he has not had labs drawn there in a year    Lm for his mother to call me back to discuss

## 2022-05-27 DIAGNOSIS — G40.919 INTRACTABLE EPILEPSY WITHOUT STATUS EPILEPTICUS, UNSPECIFIED EPILEPSY TYPE: ICD-10-CM

## 2022-05-28 DIAGNOSIS — G40.919 INTRACTABLE EPILEPSY WITHOUT STATUS EPILEPTICUS, UNSPECIFIED EPILEPSY TYPE: ICD-10-CM

## 2022-05-31 RX ORDER — LACOSAMIDE 50 MG/1
TABLET ORAL
Qty: 60 TABLET | Refills: 0 | OUTPATIENT
Start: 2022-05-31

## 2022-06-03 DIAGNOSIS — G40.919 INTRACTABLE EPILEPSY WITHOUT STATUS EPILEPTICUS, UNSPECIFIED EPILEPSY TYPE: ICD-10-CM

## 2022-06-03 RX ORDER — LACOSAMIDE 50 MG/1
TABLET ORAL
Qty: 60 TABLET | OUTPATIENT
Start: 2022-06-03

## 2022-07-06 DIAGNOSIS — G40.919 INTRACTABLE EPILEPSY WITHOUT STATUS EPILEPTICUS, UNSPECIFIED EPILEPSY TYPE: ICD-10-CM

## 2022-07-06 RX ORDER — ZONISAMIDE 100 MG/1
200 CAPSULE ORAL 2 TIMES DAILY
Qty: 360 CAPSULE | Refills: 3 | Status: SHIPPED | OUTPATIENT
Start: 2022-07-06

## 2022-07-06 RX ORDER — LACOSAMIDE 100 MG/1
TABLET ORAL
Qty: 180 TABLET | Refills: 3 | Status: SHIPPED | OUTPATIENT
Start: 2022-07-06

## 2022-07-06 RX ORDER — CLONAZEPAM 0.5 MG/1
TABLET, ORALLY DISINTEGRATING ORAL
Qty: 90 TABLET | Refills: 3 | Status: SHIPPED | OUTPATIENT
Start: 2022-07-06

## 2022-07-06 RX ORDER — DIVALPROEX SODIUM 500 MG/1
1000 TABLET, DELAYED RELEASE ORAL 2 TIMES DAILY
Qty: 360 TABLET | Refills: 3 | Status: SHIPPED | OUTPATIENT
Start: 2022-07-06

## 2022-07-06 RX ORDER — PHENYTOIN SODIUM 100 MG/1
CAPSULE, EXTENDED RELEASE ORAL
Qty: 450 CAPSULE | Refills: 3 | Status: SHIPPED | OUTPATIENT
Start: 2022-07-06 | End: 2022-09-30

## 2022-07-06 NOTE — TELEPHONE ENCOUNTER
Caller: SUPAFLORENTINO    Relationship: Mother    Best call back number: [phone]    Requested Prescriptions:   Requested Prescriptions     Pending Prescriptions Disp Refills   • phenytoin ER (DILANTIN) 100 MG capsule 150 capsule 11     Sig: Take 2 capsules in the morning and 3 capsules at night   • zonisamide (ZONEGRAN) 100 MG capsule 120 capsule 11     Sig: Take 2 capsules by mouth 2 (Two) Times a Day.   • divalproex (DEPAKOTE) 500 MG DR tablet 120 tablet 11     Sig: Take 2 tablets by mouth 2 (Two) Times a Day.   • clonazePAM (KlonoPIN) 0.5 MG disintegrating tablet 30 tablet 5     Sig: Take one tab as needed daily for seizure   • lacosamide (VIMPAT) 100 MG tablet tablet 60 tablet 5     Sig: Take 1/2 tab in the morning and 1 at night for 2 weeks, then increase to 1 tab twice daily      Pharmacy where request should be sent: [id number] [address] [facility] [phone] [facility] [phone] FX     Additional details provided by patient: PT'S MOTHER IS REQUESTING A 90-DAY SUPPLY OF EACH MEDICATION THAT IS PRESCRIBED BY DR. NOONAN TO BE SENT TO THE PREFERRED PHARMACY LISTED ABOVE.    PT HAS 1 WEEK OF MEDICATION LEFT.    Does the patient have less than a 3 day supply:  [] Yes  [x] No    PLEASE REVIEW AND ADVISE.    Dex Trinidad Rep   07/06/22 10:43 EDT

## 2022-09-30 RX ORDER — PHENYTOIN SODIUM 100 MG/1
CAPSULE, EXTENDED RELEASE ORAL
Qty: 150 CAPSULE | Refills: 3 | Status: SHIPPED | OUTPATIENT
Start: 2022-09-30

## 2023-11-09 RX ORDER — DIVALPROEX SODIUM 500 MG/1
1000 TABLET, DELAYED RELEASE ORAL 2 TIMES DAILY
Qty: 360 TABLET | Refills: 3 | OUTPATIENT
Start: 2023-11-09

## 2023-11-09 NOTE — TELEPHONE ENCOUNTER
Lm for patient's mother asking that she call me back- we have not seen him since 2022 and I am needing to know if they have established care with another neurologist